# Patient Record
Sex: FEMALE | URBAN - METROPOLITAN AREA
[De-identification: names, ages, dates, MRNs, and addresses within clinical notes are randomized per-mention and may not be internally consistent; named-entity substitution may affect disease eponyms.]

---

## 2023-03-27 ENCOUNTER — APPOINTMENT (RX ONLY)
Dept: URBAN - METROPOLITAN AREA CLINIC 123 | Facility: CLINIC | Age: 58
Setting detail: DERMATOLOGY
End: 2023-03-27

## 2023-03-27 DIAGNOSIS — L29.8 OTHER PRURITUS: ICD-10-CM

## 2023-03-27 DIAGNOSIS — L23.9 ALLERGIC CONTACT DERMATITIS, UNSPECIFIED CAUSE: ICD-10-CM | Status: INADEQUATELY CONTROLLED

## 2023-03-27 DIAGNOSIS — L29.89 OTHER PRURITUS: ICD-10-CM

## 2023-03-27 PROCEDURE — ? PRESCRIPTION

## 2023-03-27 PROCEDURE — ? COUNSELING

## 2023-03-27 PROCEDURE — ? ADDITIONAL NOTES

## 2023-03-27 PROCEDURE — 99204 OFFICE O/P NEW MOD 45 MIN: CPT

## 2023-03-27 PROCEDURE — ? FULL BODY SKIN EXAM - DECLINED

## 2023-03-27 RX ORDER — MOMETASONE FUROATE 1 MG/G
CREAM TOPICAL BID
Qty: 45 | Refills: 5 | Status: ERX | COMMUNITY
Start: 2023-03-27

## 2023-03-27 RX ADMIN — MOMETASONE FUROATE: 1 CREAM TOPICAL at 00:00

## 2023-03-27 ASSESSMENT — LOCATION DETAILED DESCRIPTION DERM
LOCATION DETAILED: LEFT PROXIMAL PRETIBIAL REGION
LOCATION DETAILED: RIGHT DISTAL PRETIBIAL REGION
LOCATION DETAILED: LEFT DISTAL POSTERIOR UPPER ARM

## 2023-03-27 ASSESSMENT — LOCATION ZONE DERM
LOCATION ZONE: LEG
LOCATION ZONE: ARM

## 2023-03-27 ASSESSMENT — LOCATION SIMPLE DESCRIPTION DERM
LOCATION SIMPLE: LEFT PRETIBIAL REGION
LOCATION SIMPLE: LEFT POSTERIOR UPPER ARM
LOCATION SIMPLE: RIGHT PRETIBIAL REGION

## 2023-03-27 NOTE — PROCEDURE: ADDITIONAL NOTES
Detail Level: Simple
Additional Notes: Gave pt samples of CeraVe itch relief moisturizing lotion x2 and CeraVe moisturizing lotion x1.
Render Risk Assessment In Note?: no
Additional Notes: Recommend pt to take non-sedating antihistamines like Claritin and Allegra.

## 2023-07-08 ENCOUNTER — EMERGENCY (EMERGENCY)
Facility: HOSPITAL | Age: 58
LOS: 1 days | Discharge: HOME | End: 2023-07-08
Attending: STUDENT IN AN ORGANIZED HEALTH CARE EDUCATION/TRAINING PROGRAM
Payer: MEDICARE

## 2023-07-08 ENCOUNTER — TRANSCRIPTION ENCOUNTER (OUTPATIENT)
Age: 58
End: 2023-07-08

## 2023-07-08 VITALS
OXYGEN SATURATION: 99 % | RESPIRATION RATE: 18 BRPM | SYSTOLIC BLOOD PRESSURE: 124 MMHG | DIASTOLIC BLOOD PRESSURE: 73 MMHG | HEART RATE: 80 BPM | TEMPERATURE: 99 F

## 2023-07-08 VITALS
OXYGEN SATURATION: 98 % | HEART RATE: 82 BPM | HEIGHT: 64 IN | RESPIRATION RATE: 16 BRPM | DIASTOLIC BLOOD PRESSURE: 105 MMHG | TEMPERATURE: 98 F | SYSTOLIC BLOOD PRESSURE: 168 MMHG | WEIGHT: 145.06 LBS

## 2023-07-08 PROCEDURE — 93005 ELECTROCARDIOGRAM TRACING: CPT

## 2023-07-08 PROCEDURE — 71100 X-RAY EXAM RIBS UNI 2 VIEWS: CPT | Mod: 26

## 2023-07-08 PROCEDURE — 71100 X-RAY EXAM RIBS UNI 2 VIEWS: CPT

## 2023-07-08 PROCEDURE — 70450 CT HEAD/BRAIN W/O DYE: CPT | Mod: 26,MA

## 2023-07-08 PROCEDURE — 99285 EMERGENCY DEPT VISIT HI MDM: CPT

## 2023-07-08 PROCEDURE — 70450 CT HEAD/BRAIN W/O DYE: CPT | Mod: MA

## 2023-07-08 PROCEDURE — 99284 EMERGENCY DEPT VISIT MOD MDM: CPT | Mod: 25

## 2023-07-08 RX ORDER — MECLIZINE HCL 12.5 MG
25 TABLET ORAL ONCE
Refills: 0 | Status: COMPLETED | OUTPATIENT
Start: 2023-07-08 | End: 2023-07-08

## 2023-07-08 RX ORDER — ACETAMINOPHEN 500 MG
650 TABLET ORAL ONCE
Refills: 0 | Status: COMPLETED | OUTPATIENT
Start: 2023-07-08 | End: 2023-07-08

## 2023-07-08 RX ADMIN — Medication 650 MILLIGRAM(S): at 16:47

## 2023-07-08 RX ADMIN — Medication 25 MILLIGRAM(S): at 16:46

## 2023-07-08 NOTE — ED PROVIDER NOTE - ATTENDING CONTRIBUTION TO CARE
I performed a history and physical exam of the patient and discussed their management with the resident. I reviewed the resident's note and agree with the documented findings and plan of care, except as noted. My medical decision making and observations are found above.

## 2023-07-08 NOTE — ED PROVIDER NOTE - OBJECTIVE STATEMENT
Patient is a 57 y/o female [facility] hypertension who presents with fall 2/2 syncope. Patient states she bent down to grab something off of a table when she felt the room spinning and fell down. She does not remember the fall and isn't sure if she hit her head on anything. Her  heard the fall and by the time he arrived she was alert and oriented. She has a history (2016) of vertigo but denies any hx of falls. She endorses a throbbing left sided headache, dizziness, R. Arm pain, R Rib pain, and R leg pain -- denies nausea, vomiting, chest pain, shortness of breath, preceding palpitations or other prodromal symptoms. She tested positive for covid on 6/28, has had waxing and waning URI symptoms since. She is not on blood thinners.

## 2023-07-08 NOTE — ED PROVIDER NOTE - PATIENT PORTAL LINK FT
You can access the FollowMyHealth Patient Portal offered by [facility] by registering at the following website: [email or web address]. By joining [facility]’s FollowMyHealth portal, you will also be able to view your health information using other applications (apps) compatible with our system.

## 2023-07-08 NOTE — ED PROVIDER NOTE - NSFOLLOWUPINSTRUCTIONS_ED_ALL_ED_FT
You were seen in the Emergency Department for syncope + fall. Lab and imaging results, if performed, were discussed with you along with your discharge diagnosis.    To control your pain at home, you should take Tylenol 650mg-1000mg every 6 to 8 hours. Limit your maximum daily Tylenol from all sources to 4000mg. Be aware that many other medications contain acetaminophen which is also known as Tylenol. You shouldn’t take medications for more than a week without following up with your doctor.     Return to ED for any new or worsening symptoms including but not limited to: development of chest pain, shortness of breath, fever, vomiting, focal numbness, weakness or tingling, any severe CP, headache, abdominal pain, back pain.      Rest and keep yourself hydrated with fluids

## 2023-07-08 NOTE — ED PROVIDER NOTE - CARE PLAN
1 Principal Discharge DX:	Syncope  Secondary Diagnosis:	Acute headache due to traumatic injury of head

## 2023-07-08 NOTE — ED PROVIDER NOTE - PHYSICAL EXAMINATION
General: Alert and Orientated x 3. No apparent distress.  Head: Normocephalic and atraumatic.  Eyes: PERRLA with EOMI.  Neck: Supple. Trachea midline.   Cardiac: Normal S1 and S2 w/ RRR. No murmurs appreciated. No JVD appreciated.  Pulmonary: CTA bilaterally. No increased WOB. No wheezes or crackles.  Abdominal: Soft, non-tender. (+) bowel sounds appreciated in all 4 quadrants. No hepatosplenomegaly.   Neurologic: No focal sensory or motor deficits.  Musculoskeletal: Strength appropriate in all 4 extremities for age with no limited ROM. TTP R bicep, R lateral thigh, R ribs  Skin: Color appropriate for race. Intact, warm, and well-perfused.  Psychiatric: Appropriate mood and affect. No apparent risk to self or others.

## 2023-07-08 NOTE — ED PROVIDER NOTE - CLINICAL SUMMARY MEDICAL DECISION MAKING FREE TEXT BOX
57 y/o female presents with headache following unwitnessed fall this AM 2/2 syncopal episode. Given history of vertigo, acute onset + recovery, lack of cardiac risk factors, presumed etiology of syncope is vertigo. The patient does not completely remember the fall and has significant R sided pain indicative of a hard fall, so will CT head and X-ray R ribs. C-spine cleared by [facility]. Other etiologies of syncope include orthostasis, but this is unlikely given patient's hypertension. Also unlikely to be central vertigo due to acute onset/resolution, lack of nystagmus, and normal neuro exam.     Plan to manage pain with tylenol, dizziness with meclizine, CT head, x-ray R. Ribs. If no signs of intracranial bleed patient will be cleared to discharge with possible follow up with neurologist for vertigo. 57 y/o female presents with headache following unwitnessed fall this AM 2/2 syncopal episode. Given history of vertigo, acute onset + recovery, lack of cardiac risk factors, presumed etiology of syncope is vertigo. The patient does not completely remember the fall and has significant R sided pain indicative of a hard fall, so will CT head and X-ray R ribs. C-spine cleared by [facility]. Other etiologies of syncope include orthostasis, but this is unlikely given patient's hypertension. Also unlikely to be central vertigo due to acute onset/resolution, lack of nystagmus, and normal neuro exam.     Plan to manage pain with tylenol, dizziness with meclizine, CT head, x-ray R. Ribs. If no signs of intracranial bleed patient will be cleared to discharge with possible follow up with neurologist for vertigo.    VRao -- 58F with PMHx HTN, HLD, vertigo presenting with syncope and fall at 11am today, with preceding unspecified "dizziness" but no chest pain, palpitations, SOB. Presumed head strike and LOC. Endorsing continued headache without change in hearing/vision, paresthesias, focal weakness. Ambulatory; no ASA/AC. Exam with +R lateral chest wall TTP, no overlying skin change. A&Ox3, CN2-12 intact. 5/5 strength and intact sensation x4 extremities. Steady independent gait.    MDM-  Dizziness and fall with presumed headstrike, subsequent headache without neuro deficits, unlikely to represent acute traumatic intracranial pathology but will evaluate with CT head. XR to evaluate for rib fx. Meds for symptomatic treatment.    EKG with nonspecific TWI/flattening in V2-4, unchanged from prior EKG in 2019 on [facility]. No Brugada, delta, or epsilon waves, no acute ischemia. Low suspicion for cardiac involvement at this time.    Imaging negative, patient reporting symptomatic improvement. Plan for dc with neuro fu.

## 2023-07-08 NOTE — ED ADULT NURSE NOTE - NSFALLUNIVINTERV_ED_ALL_ED
Bed/Stretcher in lowest position, wheels locked, appropriate side rails in place/Call bell, personal items and telephone in reach/Instruct patient to call for assistance before getting out of bed/chair/stretcher/Non-slip footwear applied when patient is off stretcher/[place] to call system/Physically safe environment - no spills, clutter or unnecessary equipment/Purposeful proactive rounding/Room/bathroom lighting operational, light cord in reach

## 2023-07-08 NOTE — ED PROVIDER NOTE - NS ED ROS FT
CONSTITUTIONAL: No fevers, no chills, + lightheadedness, + dizziness  EYES: no visual changes, no eye pain  EARS: no ear drainage, no ear pain, no change in hearing  NOSE: no nasal congestion  MOUTH/THROAT: no sore throat  CV: No chest pain, no palpitations  RESP: No SOB, no cough  GI: No n/v/d, no abd pain  : no dysuria, no hematuria, no flank pain  MSK: no back pain, + R arm, R L pain, + rib pain  SKIN: no rashes  NEURO: + headache, no focal weakness, no decreased sensation/parasthesias   PSYCHIATRIC: no known mental health issues

## 2023-07-08 NOTE — ED PROVIDER NOTE - NS ED ATTENDING STATEMENT MOD
This was a shared visit with the LUIS. I reviewed and verified the documentation and independently performed the documented: Attending with

## 2023-07-10 PROBLEM — I10 ESSENTIAL (PRIMARY) HYPERTENSION: Chronic | Status: ACTIVE | Noted: 2023-07-08

## 2023-07-10 PROBLEM — R42 DIZZINESS AND GIDDINESS: Chronic | Status: ACTIVE | Noted: 2023-07-08

## 2023-08-02 ENCOUNTER — APPOINTMENT (OUTPATIENT)
Dept: PULMONOLOGY | Facility: CLINIC | Age: 58
End: 2023-08-02
Payer: MEDICARE

## 2023-08-02 ENCOUNTER — APPOINTMENT (OUTPATIENT)
Dept: CT IMAGING | Facility: IMAGING CENTER | Age: 58
End: 2023-08-02
Payer: MEDICARE

## 2023-08-02 ENCOUNTER — OUTPATIENT (OUTPATIENT)
Dept: OUTPATIENT SERVICES | Facility: HOSPITAL | Age: 58
LOS: 1 days | End: 2023-08-02
Payer: MEDICARE

## 2023-08-02 VITALS
HEART RATE: 80 BPM | SYSTOLIC BLOOD PRESSURE: 119 MMHG | HEIGHT: 64 IN | WEIGHT: 143 LBS | BODY MASS INDEX: 24.41 KG/M2 | OXYGEN SATURATION: 100 % | DIASTOLIC BLOOD PRESSURE: 66 MMHG

## 2023-08-02 DIAGNOSIS — U07.1 COVID-19: ICD-10-CM

## 2023-08-02 DIAGNOSIS — R53.83 OTHER FATIGUE: ICD-10-CM

## 2023-08-02 DIAGNOSIS — R94.2 ABNORMAL RESULTS OF PULMONARY FUNCTION STUDIES: ICD-10-CM

## 2023-08-02 PROCEDURE — 94618 PULMONARY STRESS TESTING: CPT

## 2023-08-02 PROCEDURE — 95012 NITRIC OXIDE EXP GAS DETER: CPT

## 2023-08-02 PROCEDURE — 99205 OFFICE O/P NEW HI 60 MIN: CPT | Mod: 25

## 2023-08-02 PROCEDURE — ZZZZZ: CPT

## 2023-08-02 PROCEDURE — 94727 GAS DIL/WSHOT DETER LNG VOL: CPT

## 2023-08-02 PROCEDURE — 94010 BREATHING CAPACITY TEST: CPT

## 2023-08-02 PROCEDURE — 71046 X-RAY EXAM CHEST 2 VIEWS: CPT

## 2023-08-02 PROCEDURE — 71250 CT THORAX DX C-: CPT

## 2023-08-02 PROCEDURE — 71250 CT THORAX DX C-: CPT | Mod: 26,MH

## 2023-08-02 PROCEDURE — 94729 DIFFUSING CAPACITY: CPT

## 2023-08-02 NOTE — PROCEDURE
[FreeTextEntry1] : NIOX nl  range `16  ppb 8/2/23  Chest x-ray PA lateral 8/2/23 Cardiac size normal Clear lung fields Parenchymal dominant pulmonary nodules Structures unremarkable Impression clear lungsSoft tissue  PFT no bronchodilator 42nd 2023 Mild reduction in flow rates Lung volumes normal Decreased ERV 25% predicted secondary to increased abdominal girth Positive air-trapping with RV/TLC ratio Percent predicted Moderate to severe reduction diffusion 54% predicted with gas exchange impairment  Able to compare data dating back to a PFT study of October 16, 2014 which demonstrates decline in overall pulmonary physiology  Pulmonary 6-minute walk exercise study August 2, 2023 Baseline O2 saturation 94% Room air Andrwe desaturation is unremarkable No desaturation Impression normal

## 2023-08-02 NOTE — PHYSICAL EXAM
[No Acute Distress] : no acute distress [Normal Oropharynx] : normal oropharynx [I] : Mallampati Class: I [Normal Appearance] : normal appearance [Supple] : supple [No Neck Mass] : no neck mass [No JVD] : no jvd [Normal Rate/Rhythm] : normal rate/rhythm [Normal S1, S2] : normal s1, s2 [No Murmurs] : no murmurs [No Rubs] : no rubs [No Gallops] : no gallops [No Resp Distress] : no resp distress [Normal Palpation] : normal palpation [Normal Rhythm and Effort] : normal rhythm and effort [Normal to Percussion] : normal to percussion [No Abnormalities] : no abnormalities [Benign] : benign [Not Tender] : not tender [Soft] : soft [No Clubbing] : no clubbing [No Cyanosis] : no cyanosis [No Edema] : no edema [Normal Color/ Pigmentation] : normal color/ pigmentation [No Focal Deficits] : no focal deficits [Oriented x3] : oriented x3 [Normal Affect] : normal affect

## 2023-08-02 NOTE — DISCUSSION/SUMMARY
[FreeTextEntry1] : Post COVID-19 infection Fatigue Exertional dyspnea Ongoing rheumatologic work-up with serology available for review which was negative Rule out subtle interstitial lung disease with abnormal PFT and gas exchange impairment CT chest Office follow-up

## 2023-08-02 NOTE — HISTORY OF PRESENT ILLNESS
[Never] : never [TextBox_4] : 58 year old  patient  Post COVID June 2023  persistent DRISCOLL  fatigue  no hx COPD asthma pneumonia Bronchitis  no wheeze  no  sputum  outside data SSAB neg Vit D 32.9 nl range uric acid nl range TSH  1.28 nl  CK nl range CCPO Ab neg RF neg CRP Neg ESR 22  Glucose 101 nl renal fx and lytes liver enz nl CBC nl  range

## 2023-08-02 NOTE — REVIEW OF SYSTEMS
[Fatigue] : fatigue [Back Pain] : back pain [Negative] : Psychiatric [Fever] : no fever [Chills] : no chills [Anxiety] : no anxiety [Diabetes] : no diabetes [Thyroid Problem] : no thyroid problem [TextBox_30] : HPI [TextBox_57] : ASA, NSAID

## 2023-08-10 ENCOUNTER — APPOINTMENT (OUTPATIENT)
Dept: PULMONOLOGY | Facility: CLINIC | Age: 58
End: 2023-08-10
Payer: MEDICARE

## 2023-08-10 VITALS — DIASTOLIC BLOOD PRESSURE: 83 MMHG | OXYGEN SATURATION: 99 % | SYSTOLIC BLOOD PRESSURE: 129 MMHG | HEART RATE: 59 BPM

## 2023-08-10 DIAGNOSIS — K21.9 GASTRO-ESOPHAGEAL REFLUX DISEASE W/OUT ESOPHAGITIS: ICD-10-CM

## 2023-08-10 DIAGNOSIS — R53.81 OTHER MALAISE: ICD-10-CM

## 2023-08-10 PROCEDURE — 99214 OFFICE O/P EST MOD 30 MIN: CPT

## 2023-08-10 RX ORDER — LANSOPRAZOLE 30 MG/1
30 CAPSULE, DELAYED RELEASE ORAL DAILY
Qty: 90 | Refills: 1 | Status: ACTIVE | COMMUNITY
Start: 2023-08-10 | End: 1900-01-01

## 2023-08-10 NOTE — HISTORY OF PRESENT ILLNESS
[Never] : never [TextBox_4] : 58 year old  patient  Post COVID June 2023  persistent DRISCOLL  fatigue  no hx COPD asthma pneumonia Bronchitis  no wheeze  no  sputum CT CHEST granuloma without parenchymal lung disease or ILD states cardiac ECHO per patient verbal neg GERD sxs but under GI  management outside data SSAB neg Vit D 32.9 nl range uric acid nl range TSH  1.28 nl  CK nl range CCPO Ab neg RF neg CRP Neg ESR 22  Glucose 101 nl renal fx and lytes liver enz nl CBC nl  range

## 2023-08-10 NOTE — DISCUSSION/SUMMARY
[FreeTextEntry1] : Post COVID-19 infection Pulmonary granuloma  No CT CHEST finding ILD Fatigue Exertional dyspnea Physical deconditioning Ongoing rheumatologic work-up with serology available for review which was negative Rule out subtle interstitial lung disease with abnormal PFT and gas exchange impairment CT chest as  noted Office follow-up  3  months PFT with BOX

## 2023-08-10 NOTE — REASON FOR VISIT
[Follow-Up] : a follow-up visit [Shortness of Breath] : shortness of breath [TextBox_44] : COVID Infection

## 2023-08-10 NOTE — PROCEDURE
[FreeTextEntry1] : CT chest 8/2/23 Left lower lobe calcified granuloma calcified left hilar lymph node compatible with Fatty liver No interstitial lung disease  NIOX nl  range `16  ppb 8/2/23  Chest x-ray PA lateral 8/2/23 Cardiac size normal Clear lung fields Parenchymal dominant pulmonary nodules Structures unremarkable Impression clear lungsSoft tissue  PFT no bronchodilator 8/2/23 Mild reduction in flow rates Lung volumes normal Decreased ERV 25% predicted secondary to increased abdominal girth Positive air-trapping with RV/TLC ratio Percent predicted Moderate to severe reduction diffusion 54% predicted with gas exchange impairment  Able to compare data dating back to a PFT study of October 16, 2014 which demonstrates decline in overall pulmonary physiology  Pulmonary 6-minute walk exercise study August 2, 2023 Baseline O2 saturation 94% Room air Andrew desaturation is unremarkable No desaturation Impression normal

## 2023-08-15 ENCOUNTER — APPOINTMENT (OUTPATIENT)
Dept: OTOLARYNGOLOGY | Facility: CLINIC | Age: 58
End: 2023-08-15

## 2023-09-29 ENCOUNTER — APPOINTMENT (OUTPATIENT)
Dept: PULMONOLOGY | Facility: CLINIC | Age: 58
End: 2023-09-29
Payer: MEDICARE

## 2023-09-29 PROCEDURE — 99213 OFFICE O/P EST LOW 20 MIN: CPT | Mod: 95

## 2023-09-29 RX ORDER — LOSARTAN POTASSIUM 25 MG/1
25 TABLET, FILM COATED ORAL
Refills: 0 | Status: ACTIVE | COMMUNITY
Start: 2023-09-29

## 2023-09-29 RX ORDER — ESOMEPRAZOLE MAGNESIUM 40 MG/1
40 CAPSULE, DELAYED RELEASE ORAL
Qty: 90 | Refills: 1 | Status: ACTIVE | COMMUNITY
Start: 2023-09-29 | End: 1900-01-01

## 2023-11-09 ENCOUNTER — APPOINTMENT (OUTPATIENT)
Dept: PULMONOLOGY | Facility: CLINIC | Age: 58
End: 2023-11-09
Payer: MEDICARE

## 2023-11-09 VITALS
RESPIRATION RATE: 16 BRPM | SYSTOLIC BLOOD PRESSURE: 114 MMHG | OXYGEN SATURATION: 95 % | DIASTOLIC BLOOD PRESSURE: 78 MMHG | HEART RATE: 71 BPM

## 2023-11-09 DIAGNOSIS — Z00.00 ENCOUNTER FOR GENERAL ADULT MEDICAL EXAMINATION W/OUT ABNORMAL FINDINGS: ICD-10-CM

## 2023-11-09 DIAGNOSIS — J84.10 PULMONARY FIBROSIS, UNSPECIFIED: ICD-10-CM

## 2023-11-09 DIAGNOSIS — Z23 ENCOUNTER FOR IMMUNIZATION: ICD-10-CM

## 2023-11-09 DIAGNOSIS — R06.02 SHORTNESS OF BREATH: ICD-10-CM

## 2023-11-09 DIAGNOSIS — E78.5 HYPERLIPIDEMIA, UNSPECIFIED: ICD-10-CM

## 2023-11-09 DIAGNOSIS — I10 ESSENTIAL (PRIMARY) HYPERTENSION: ICD-10-CM

## 2023-11-09 DIAGNOSIS — R94.2 ABNORMAL RESULTS OF PULMONARY FUNCTION STUDIES: ICD-10-CM

## 2023-11-09 LAB — POCT - HEMOGLOBIN (HGB), QUANTITATIVE, TRANSCUTANEOUS: 14.7

## 2023-11-09 PROCEDURE — 90686 IIV4 VACC NO PRSV 0.5 ML IM: CPT

## 2023-11-09 PROCEDURE — 94729 DIFFUSING CAPACITY: CPT

## 2023-11-09 PROCEDURE — 88738 HGB QUANT TRANSCUTANEOUS: CPT

## 2023-11-09 PROCEDURE — G0008: CPT

## 2023-11-09 PROCEDURE — 94727 GAS DIL/WSHOT DETER LNG VOL: CPT

## 2023-11-09 PROCEDURE — ZZZZZ: CPT

## 2023-11-09 PROCEDURE — 94010 BREATHING CAPACITY TEST: CPT

## 2023-11-09 PROCEDURE — 99214 OFFICE O/P EST MOD 30 MIN: CPT | Mod: 25

## 2023-11-09 PROCEDURE — 81003 URINALYSIS AUTO W/O SCOPE: CPT | Mod: QW

## 2023-11-10 DIAGNOSIS — E80.6 OTHER DISORDERS OF BILIRUBIN METABOLISM: ICD-10-CM

## 2023-11-10 LAB
ALBUMIN SERPL ELPH-MCNC: 5 G/DL
ALP BLD-CCNC: 86 U/L
ALT SERPL-CCNC: 35 U/L
ANION GAP SERPL CALC-SCNC: 15 MMOL/L
AST SERPL-CCNC: 28 U/L
BASOPHILS # BLD AUTO: 0.03 K/UL
BASOPHILS NFR BLD AUTO: 0.4 %
BILIRUB DIRECT SERPL-MCNC: 0.4 MG/DL
BILIRUB INDIRECT SERPL-MCNC: 1.2 MG/DL
BILIRUB SERPL-MCNC: 1.6 MG/DL
BUN SERPL-MCNC: 12 MG/DL
CALCIUM SERPL-MCNC: 10.3 MG/DL
CHLORIDE SERPL-SCNC: 101 MMOL/L
CHOLEST SERPL-MCNC: 175 MG/DL
CO2 SERPL-SCNC: 25 MMOL/L
CREAT SERPL-MCNC: 0.64 MG/DL
EGFR: 101 ML/MIN/1.73M2
EOSINOPHIL # BLD AUTO: 0.15 K/UL
EOSINOPHIL NFR BLD AUTO: 2 %
ESTIMATED AVERAGE GLUCOSE: 103 MG/DL
GLUCOSE SERPL-MCNC: 96 MG/DL
HBA1C MFR BLD HPLC: 5.2 %
HCT VFR BLD CALC: 43 %
HDLC SERPL-MCNC: 100 MG/DL
HGB BLD-MCNC: 14.1 G/DL
IMM GRANULOCYTES NFR BLD AUTO: 0.3 %
LDLC SERPL CALC-MCNC: 58 MG/DL
LYMPHOCYTES # BLD AUTO: 2.72 K/UL
LYMPHOCYTES NFR BLD AUTO: 35.5 %
MAN DIFF?: NORMAL
MCHC RBC-ENTMCNC: 32.3 PG
MCHC RBC-ENTMCNC: 32.8 GM/DL
MCV RBC AUTO: 98.4 FL
MONOCYTES # BLD AUTO: 0.59 K/UL
MONOCYTES NFR BLD AUTO: 7.7 %
NEUTROPHILS # BLD AUTO: 4.16 K/UL
NEUTROPHILS NFR BLD AUTO: 54.1 %
NONHDLC SERPL-MCNC: 76 MG/DL
PLATELET # BLD AUTO: 336 K/UL
POTASSIUM SERPL-SCNC: 4.6 MMOL/L
PROT SERPL-MCNC: 7.6 G/DL
RBC # BLD: 4.37 M/UL
RBC # FLD: 12.5 %
SODIUM SERPL-SCNC: 141 MMOL/L
T3 SERPL-MCNC: 119 NG/DL
T4 SERPL-MCNC: 6.3 UG/DL
TRIGL SERPL-MCNC: 102 MG/DL
TSH SERPL-ACNC: 2.13 UIU/ML
WBC # FLD AUTO: 7.67 K/UL

## 2023-11-17 ENCOUNTER — NON-APPOINTMENT (OUTPATIENT)
Age: 58
End: 2023-11-17

## 2023-11-17 ENCOUNTER — APPOINTMENT (OUTPATIENT)
Dept: ULTRASOUND IMAGING | Facility: CLINIC | Age: 58
End: 2023-11-17
Payer: MEDICARE

## 2023-11-17 DIAGNOSIS — K76.0 FATTY (CHANGE OF) LIVER, NOT ELSEWHERE CLASSIFIED: ICD-10-CM

## 2023-11-17 PROCEDURE — 76700 US EXAM ABDOM COMPLETE: CPT

## 2023-11-29 ENCOUNTER — OFFICE (OUTPATIENT)
Dept: URBAN - METROPOLITAN AREA CLINIC 90 | Facility: CLINIC | Age: 58
Setting detail: OPHTHALMOLOGY
End: 2023-11-29
Payer: MEDICARE

## 2023-11-29 DIAGNOSIS — H25.13: ICD-10-CM

## 2023-11-29 DIAGNOSIS — H01.004: ICD-10-CM

## 2023-11-29 DIAGNOSIS — H16.223: ICD-10-CM

## 2023-11-29 DIAGNOSIS — H01.005: ICD-10-CM

## 2023-11-29 PROCEDURE — 92014 COMPRE OPH EXAM EST PT 1/>: CPT | Performed by: OPHTHALMOLOGY

## 2023-11-29 ASSESSMENT — TEAR BREAK UP TIME (TBUT)
OS_TBUT: 2+
OD_TBUT: 2+

## 2023-11-29 ASSESSMENT — REFRACTION_AUTOREFRACTION
OD_CYLINDER: -0.50
OS_CYLINDER: -0.50
OS_SPHERE: +1.50
OS_AXIS: 053
OD_SPHERE: +1.25
OD_AXIS: 072

## 2023-11-29 ASSESSMENT — SUPERFICIAL PUNCTATE KERATITIS (SPK)
OS_SPK: ABSENT
OD_SPK: ABSENT

## 2023-11-29 ASSESSMENT — LID EXAM ASSESSMENTS
OD_COMMENTS: NO FOREIGN BODY, NO CONCRETIONS/CALCIFICATIONS.
OD_COMMENTS: LID EVERSION NORMAL
OS_COMMENTS: NO FOREIGN BODY, NO CONCRETIONS/CALCIFICATIONS.
OS_COMMENTS: LID EVERSION NORMAL
OS_BLEPHARITIS: LLL LUL T 1+

## 2023-11-29 ASSESSMENT — SPHEQUIV_DERIVED
OD_SPHEQUIV: 0.875
OD_SPHEQUIV: 1
OS_SPHEQUIV: 1.25
OS_SPHEQUIV: 1

## 2023-11-29 ASSESSMENT — REFRACTION_CURRENTRX
OS_OVR_VA: 20/
OS_SPHERE: +1.00
OD_ADD: +1.75
OS_ADD: +1.75
OD_SPHERE: +0.50
OD_CYLINDER: -
OD_OVR_VA: 20/

## 2023-11-29 ASSESSMENT — REFRACTION_MANIFEST
OS_AXIS: 135
OS_SPHERE: +0.75
OD_CYLINDER: SPHERE
OS_CYLINDER: +0.50
OD_SPHERE: +0.75
OS_CYLINDER: SPHERE
OD_VA1: 20/20
OS_VA1: 20/20
OS_SPHERE: +1.00
OD_SPHERE: +0.75
OD_CYLINDER: +0.25
OD_AXIS: 155

## 2023-11-29 ASSESSMENT — CONFRONTATIONAL VISUAL FIELD TEST (CVF)
OD_FINDINGS: FULL
OS_FINDINGS: FULL

## 2023-12-05 ENCOUNTER — NON-APPOINTMENT (OUTPATIENT)
Age: 58
End: 2023-12-05

## 2023-12-22 ENCOUNTER — RX RENEWAL (OUTPATIENT)
Age: 58
End: 2023-12-22

## 2023-12-27 ENCOUNTER — RX RENEWAL (OUTPATIENT)
Age: 58
End: 2023-12-27

## 2024-01-23 ENCOUNTER — RX RENEWAL (OUTPATIENT)
Age: 59
End: 2024-01-23

## 2024-01-23 RX ORDER — ATORVASTATIN CALCIUM 20 MG/1
20 TABLET, FILM COATED ORAL
Qty: 30 | Refills: 0 | Status: ACTIVE | COMMUNITY
Start: 2023-09-29 | End: 1900-01-01

## 2024-02-11 ENCOUNTER — RX RENEWAL (OUTPATIENT)
Age: 59
End: 2024-02-11

## 2024-02-11 RX ORDER — LOSARTAN POTASSIUM 25 MG/1
25 TABLET, FILM COATED ORAL
Qty: 90 | Refills: 0 | Status: ACTIVE | COMMUNITY
Start: 2023-09-29 | End: 1900-01-01

## 2024-05-02 ENCOUNTER — APPOINTMENT (OUTPATIENT)
Dept: PULMONOLOGY | Facility: CLINIC | Age: 59
End: 2024-05-02

## 2024-07-11 ENCOUNTER — APPOINTMENT (OUTPATIENT)
Dept: PULMONOLOGY | Facility: CLINIC | Age: 59
End: 2024-07-11

## 2024-08-22 ENCOUNTER — RX RENEWAL (OUTPATIENT)
Age: 59
End: 2024-08-22

## 2024-08-22 ENCOUNTER — APPOINTMENT (OUTPATIENT)
Dept: PULMONOLOGY | Facility: CLINIC | Age: 59
End: 2024-08-22

## 2024-09-27 ENCOUNTER — OFFICE (OUTPATIENT)
Age: 59
Setting detail: OPHTHALMOLOGY
End: 2024-09-27
Payer: MEDICARE

## 2024-09-27 DIAGNOSIS — H25.13: ICD-10-CM

## 2024-09-27 DIAGNOSIS — H01.004: ICD-10-CM

## 2024-09-27 DIAGNOSIS — H16.223: ICD-10-CM

## 2024-09-27 DIAGNOSIS — H01.001: ICD-10-CM

## 2024-09-27 PROCEDURE — 92012 INTRM OPH EXAM EST PATIENT: CPT | Performed by: OPHTHALMOLOGY

## 2024-09-27 ASSESSMENT — CONFRONTATIONAL VISUAL FIELD TEST (CVF)
OS_FINDINGS: FULL
OD_FINDINGS: FULL

## 2024-09-27 ASSESSMENT — LID EXAM ASSESSMENTS
OD_BLEPHARITIS: RUL 2+
OS_BLEPHARITIS: LUL 2+

## 2024-10-05 ENCOUNTER — NON-APPOINTMENT (OUTPATIENT)
Age: 59
End: 2024-10-05

## 2024-10-09 ENCOUNTER — APPOINTMENT (OUTPATIENT)
Dept: PULMONOLOGY | Facility: CLINIC | Age: 59
End: 2024-10-09
Payer: MEDICARE

## 2024-10-09 ENCOUNTER — LABORATORY RESULT (OUTPATIENT)
Age: 59
End: 2024-10-09

## 2024-10-09 ENCOUNTER — APPOINTMENT (OUTPATIENT)
Dept: PULMONOLOGY | Facility: CLINIC | Age: 59
End: 2024-10-09

## 2024-10-09 VITALS — HEART RATE: 72 BPM | DIASTOLIC BLOOD PRESSURE: 81 MMHG | OXYGEN SATURATION: 97 % | SYSTOLIC BLOOD PRESSURE: 128 MMHG

## 2024-10-09 DIAGNOSIS — K21.9 GASTRO-ESOPHAGEAL REFLUX DISEASE W/OUT ESOPHAGITIS: ICD-10-CM

## 2024-10-09 DIAGNOSIS — R94.2 ABNORMAL RESULTS OF PULMONARY FUNCTION STUDIES: ICD-10-CM

## 2024-10-09 DIAGNOSIS — R53.81 OTHER MALAISE: ICD-10-CM

## 2024-10-09 DIAGNOSIS — I10 ESSENTIAL (PRIMARY) HYPERTENSION: ICD-10-CM

## 2024-10-09 DIAGNOSIS — E78.5 HYPERLIPIDEMIA, UNSPECIFIED: ICD-10-CM

## 2024-10-09 DIAGNOSIS — D35.2 BENIGN NEOPLASM OF PITUITARY GLAND: ICD-10-CM

## 2024-10-09 DIAGNOSIS — Z00.00 ENCOUNTER FOR GENERAL ADULT MEDICAL EXAMINATION W/OUT ABNORMAL FINDINGS: ICD-10-CM

## 2024-10-09 LAB — POCT - HEMOGLOBIN (HGB), QUANTITATIVE, TRANSCUTANEOUS: 14.4

## 2024-10-09 PROCEDURE — ZZZZZ: CPT

## 2024-10-09 PROCEDURE — 94729 DIFFUSING CAPACITY: CPT

## 2024-10-09 PROCEDURE — 99214 OFFICE O/P EST MOD 30 MIN: CPT | Mod: 25

## 2024-10-09 PROCEDURE — 90656 IIV3 VACC NO PRSV 0.5 ML IM: CPT

## 2024-10-09 PROCEDURE — 93000 ELECTROCARDIOGRAM COMPLETE: CPT

## 2024-10-09 PROCEDURE — 81003 URINALYSIS AUTO W/O SCOPE: CPT | Mod: QW

## 2024-10-09 PROCEDURE — 88738 HGB QUANT TRANSCUTANEOUS: CPT

## 2024-10-09 PROCEDURE — G0008: CPT

## 2024-10-09 PROCEDURE — 71046 X-RAY EXAM CHEST 2 VIEWS: CPT

## 2024-10-09 PROCEDURE — 36415 COLL VENOUS BLD VENIPUNCTURE: CPT

## 2024-10-09 PROCEDURE — 94727 GAS DIL/WSHOT DETER LNG VOL: CPT

## 2024-10-09 PROCEDURE — 77085 DXA BONE DENSITY AXL VRT FX: CPT

## 2024-10-09 PROCEDURE — 94010 BREATHING CAPACITY TEST: CPT

## 2024-10-10 ENCOUNTER — NON-APPOINTMENT (OUTPATIENT)
Age: 59
End: 2024-10-10

## 2024-10-10 DIAGNOSIS — R79.89 OTHER SPECIFIED ABNORMAL FINDINGS OF BLOOD CHEMISTRY: ICD-10-CM

## 2024-10-10 LAB
25(OH)D3 SERPL-MCNC: 29.8 NG/ML
ALBUMIN SERPL ELPH-MCNC: 4.6 G/DL
ALP BLD-CCNC: 73 U/L
ALT SERPL-CCNC: 32 U/L
ANION GAP SERPL CALC-SCNC: 15 MMOL/L
AST SERPL-CCNC: 26 U/L
BASOPHILS # BLD AUTO: 0.03 K/UL
BASOPHILS NFR BLD AUTO: 0.5 %
BILIRUB DIRECT SERPL-MCNC: 0.2 MG/DL
BILIRUB INDIRECT SERPL-MCNC: 1 MG/DL
BILIRUB SERPL-MCNC: 1.2 MG/DL
BUN SERPL-MCNC: 8 MG/DL
CALCIUM SERPL-MCNC: 9.8 MG/DL
CHLORIDE SERPL-SCNC: 105 MMOL/L
CHOLEST SERPL-MCNC: 264 MG/DL
CO2 SERPL-SCNC: 23 MMOL/L
CREAT SERPL-MCNC: 0.66 MG/DL
EGFR: 100 ML/MIN/1.73M2
EOSINOPHIL # BLD AUTO: 0.18 K/UL
EOSINOPHIL NFR BLD AUTO: 3.1 %
ESTIMATED AVERAGE GLUCOSE: 103 MG/DL
FOLATE SERPL-MCNC: 16.9 NG/ML
GLUCOSE SERPL-MCNC: 89 MG/DL
HBA1C MFR BLD HPLC: 5.2 %
HCT VFR BLD CALC: 42.3 %
HCV AB SER QL: NONREACTIVE
HCV S/CO RATIO: 0.13 S/CO
HDLC SERPL-MCNC: 103 MG/DL
HGB BLD-MCNC: 14.1 G/DL
IMM GRANULOCYTES NFR BLD AUTO: 0.2 %
LDLC SERPL CALC-MCNC: 134 MG/DL
LYMPHOCYTES # BLD AUTO: 3.04 K/UL
LYMPHOCYTES NFR BLD AUTO: 52.1 %
MAN DIFF?: NORMAL
MCHC RBC-ENTMCNC: 32.8 PG
MCHC RBC-ENTMCNC: 33.3 GM/DL
MCV RBC AUTO: 98.4 FL
MONOCYTES # BLD AUTO: 0.5 K/UL
MONOCYTES NFR BLD AUTO: 8.6 %
NEUTROPHILS # BLD AUTO: 2.08 K/UL
NEUTROPHILS NFR BLD AUTO: 35.5 %
NONHDLC SERPL-MCNC: 161 MG/DL
PLATELET # BLD AUTO: 312 K/UL
POTASSIUM SERPL-SCNC: 4.8 MMOL/L
PROLACTIN SERPL-MCNC: 9.5 NG/ML
PROT SERPL-MCNC: 6.9 G/DL
RBC # BLD: 4.3 M/UL
RBC # FLD: 12.3 %
SODIUM SERPL-SCNC: 143 MMOL/L
T3 SERPL-MCNC: 107 NG/DL
T3RU NFR SERPL: 1.1 TBI
T4 FREE SERPL-MCNC: 1 NG/DL
T4 SERPL-MCNC: 6 UG/DL
TRIGL SERPL-MCNC: 158 MG/DL
TSH SERPL-ACNC: 2.25 UIU/ML
VIT B12 SERPL-MCNC: 514 PG/ML
WBC # FLD AUTO: 5.84 K/UL

## 2024-10-15 ENCOUNTER — NON-APPOINTMENT (OUTPATIENT)
Age: 59
End: 2024-10-15

## 2024-10-18 ENCOUNTER — TRANSCRIPTION ENCOUNTER (OUTPATIENT)
Age: 59
End: 2024-10-18

## 2024-10-21 ENCOUNTER — APPOINTMENT (OUTPATIENT)
Dept: MRI IMAGING | Facility: CLINIC | Age: 59
End: 2024-10-21
Payer: MEDICARE

## 2024-10-21 ENCOUNTER — OUTPATIENT (OUTPATIENT)
Dept: OUTPATIENT SERVICES | Facility: HOSPITAL | Age: 59
LOS: 1 days | End: 2024-10-21
Payer: MEDICARE

## 2024-10-21 DIAGNOSIS — D35.2 BENIGN NEOPLASM OF PITUITARY GLAND: ICD-10-CM

## 2024-10-21 PROCEDURE — 70553 MRI BRAIN STEM W/O & W/DYE: CPT | Mod: 26,MH

## 2024-10-23 ENCOUNTER — NON-APPOINTMENT (OUTPATIENT)
Age: 59
End: 2024-10-23

## 2024-10-24 ENCOUNTER — RX ONLY (RX ONLY)
Age: 59
End: 2024-10-24

## 2024-10-24 ENCOUNTER — OFFICE (OUTPATIENT)
Age: 59
Setting detail: OPHTHALMOLOGY
End: 2024-10-24
Payer: MEDICARE

## 2024-10-24 DIAGNOSIS — H25.13: ICD-10-CM

## 2024-10-24 DIAGNOSIS — H01.004: ICD-10-CM

## 2024-10-24 DIAGNOSIS — H01.001: ICD-10-CM

## 2024-10-24 DIAGNOSIS — H16.223: ICD-10-CM

## 2024-10-24 DIAGNOSIS — D35.2: ICD-10-CM

## 2024-10-24 PROCEDURE — 92014 COMPRE OPH EXAM EST PT 1/>: CPT | Performed by: OPHTHALMOLOGY

## 2024-10-24 PROCEDURE — 92083 EXTENDED VISUAL FIELD XM: CPT | Performed by: OPHTHALMOLOGY

## 2024-10-24 ASSESSMENT — REFRACTION_AUTOREFRACTION
OS_SPHERE: +1.50
OS_AXIS: 053
OD_CYLINDER: -0.25
OS_CYLINDER: -0.75
OD_AXIS: 153
OD_SPHERE: +1.00

## 2024-10-24 ASSESSMENT — REFRACTION_MANIFEST
OS_CYLINDER: SPHERE
OD_VA1: 20/20
OD_SPHERE: +0.75
OS_CYLINDER: +0.50
OD_CYLINDER: SPHERE
OD_AXIS: 155
OS_AXIS: 135
OS_SPHERE: +1.00
OD_CYLINDER: +0.25
OS_SPHERE: +0.75
OS_VA1: 20/20
OD_SPHERE: +0.75

## 2024-10-24 ASSESSMENT — TEAR BREAK UP TIME (TBUT)
OS_TBUT: 2+
OD_TBUT: 2+

## 2024-10-24 ASSESSMENT — LID EXAM ASSESSMENTS
OD_BLEPHARITIS: RUL 2+
OS_BLEPHARITIS: LUL 2+

## 2024-10-24 ASSESSMENT — REFRACTION_CURRENTRX
OD_ADD: +1.75
OS_OVR_VA: 20/
OD_OVR_VA: 20/
OD_SPHERE: +0.50
OD_CYLINDER: -
OS_ADD: +1.75
OS_SPHERE: +1.00

## 2024-10-24 ASSESSMENT — KERATOMETRY
OD_K1POWER_DIOPTERS: 41.50
OS_AXISANGLE_DEGREES: 113
OS_K1POWER_DIOPTERS: 41.75
METHOD_AUTO_MANUAL: AUTO
OS_K2POWER_DIOPTERS: 42.75
OD_AXISANGLE_DEGREES: 076
OD_K2POWER_DIOPTERS: 42.25

## 2024-10-24 ASSESSMENT — VISUAL ACUITY
OD_BCVA: 20/25
OS_BCVA: 20/30

## 2024-10-24 ASSESSMENT — TONOMETRY
OD_IOP_MMHG: 13
OS_IOP_MMHG: 15

## 2024-10-31 ENCOUNTER — APPOINTMENT (OUTPATIENT)
Dept: NEUROSURGERY | Facility: CLINIC | Age: 59
End: 2024-10-31

## 2024-11-07 ENCOUNTER — APPOINTMENT (OUTPATIENT)
Dept: PULMONOLOGY | Facility: CLINIC | Age: 59
End: 2024-11-07

## 2024-11-14 ENCOUNTER — OFFICE (OUTPATIENT)
Age: 59
Setting detail: OPHTHALMOLOGY
End: 2024-11-14
Payer: MEDICARE

## 2024-11-14 DIAGNOSIS — H01.001: ICD-10-CM

## 2024-11-14 DIAGNOSIS — H01.004: ICD-10-CM

## 2024-11-14 DIAGNOSIS — H16.223: ICD-10-CM

## 2024-11-14 PROCEDURE — 92012 INTRM OPH EXAM EST PATIENT: CPT | Performed by: STUDENT IN AN ORGANIZED HEALTH CARE EDUCATION/TRAINING PROGRAM

## 2024-11-14 ASSESSMENT — REFRACTION_MANIFEST
OS_CYLINDER: +0.50
OD_CYLINDER: SPHERE
OD_CYLINDER: +0.25
OD_VA1: 20/20
OD_AXIS: 155
OS_SPHERE: +0.75
OS_AXIS: 135
OS_SPHERE: +1.00
OS_CYLINDER: SPHERE
OS_VA1: 20/20
OD_SPHERE: +0.75
OD_SPHERE: +0.75

## 2024-11-14 ASSESSMENT — REFRACTION_AUTOREFRACTION
OS_SPHERE: +1.75
OD_SPHERE: +1.75
OS_AXIS: 066
OD_AXIS: 072
OD_CYLINDER: -0.50
OS_CYLINDER: -0.75

## 2024-11-14 ASSESSMENT — REFRACTION_CURRENTRX
OD_CYLINDER: -
OD_ADD: +1.75
OD_SPHERE: +0.50
OS_OVR_VA: 20/
OS_ADD: +1.75
OD_OVR_VA: 20/
OS_SPHERE: +1.00

## 2024-11-14 ASSESSMENT — LID EXAM ASSESSMENTS
OS_BLEPHARITIS: LUL 2+
OD_BLEPHARITIS: RUL 2+

## 2024-11-14 ASSESSMENT — KERATOMETRY
OD_AXISANGLE_DEGREES: 076
OD_K2POWER_DIOPTERS: 42.25
OS_K1POWER_DIOPTERS: 41.75
OD_K1POWER_DIOPTERS: 41.50
METHOD_AUTO_MANUAL: AUTO
OS_K2POWER_DIOPTERS: 42.75
OS_AXISANGLE_DEGREES: 113

## 2024-11-14 ASSESSMENT — CONFRONTATIONAL VISUAL FIELD TEST (CVF)
OD_FINDINGS: FULL
OS_FINDINGS: FULL

## 2024-11-14 ASSESSMENT — VISUAL ACUITY
OD_BCVA: 20/20-1
OS_BCVA: 20/30

## 2024-11-14 ASSESSMENT — TEAR BREAK UP TIME (TBUT)
OS_TBUT: 2+
OD_TBUT: 2+

## 2024-11-20 PROCEDURE — 70553 MRI BRAIN STEM W/O & W/DYE: CPT

## 2024-11-20 PROCEDURE — A9585: CPT

## 2024-12-02 ENCOUNTER — APPOINTMENT (OUTPATIENT)
Facility: CLINIC | Age: 59
End: 2024-12-02

## 2024-12-02 VITALS
SYSTOLIC BLOOD PRESSURE: 126 MMHG | HEART RATE: 75 BPM | OXYGEN SATURATION: 98 % | BODY MASS INDEX: 24.41 KG/M2 | WEIGHT: 143 LBS | HEIGHT: 64 IN | DIASTOLIC BLOOD PRESSURE: 80 MMHG

## 2024-12-02 DIAGNOSIS — D35.2 BENIGN NEOPLASM OF PITUITARY GLAND: ICD-10-CM

## 2024-12-02 PROCEDURE — G2211 COMPLEX E/M VISIT ADD ON: CPT

## 2024-12-02 PROCEDURE — 99204 OFFICE O/P NEW MOD 45 MIN: CPT

## 2024-12-02 RX ORDER — DEXAMETHASONE 1 MG/1
1 TABLET ORAL
Qty: 1 | Refills: 0 | Status: ACTIVE | COMMUNITY
Start: 2024-12-02 | End: 1900-01-01

## 2025-01-07 ASSESSMENT — REFRACTION_CURRENTRX
OS_ADD: +1.75
OD_OVR_VA: 20/
OD_ADD: +1.75
OS_OVR_VA: 20/
OS_SPHERE: +1.00
OD_SPHERE: +0.50
OD_CYLINDER: -

## 2025-01-07 ASSESSMENT — REFRACTION_MANIFEST
OS_CYLINDER: SPHERE
OD_SPHERE: +0.75
OS_ADD: +2.25
OD_CYLINDER: +0.25
OS_SPHERE: +1.00
OD_CYLINDER: SPHERE
OS_CYLINDER: SPHERE
OD_SPHERE: +0.75
OS_VA1: 20/20
OS_AXIS: 135
OD_ADD: +2.25
OD_SPHERE: +0.75
OS_SPHERE: +1.00
OD_AXIS: 155
OD_CYLINDER: SPHERE
OS_CYLINDER: +0.50
OD_VA1: 20/20
OS_SPHERE: +0.75

## 2025-01-07 ASSESSMENT — KERATOMETRY
OD_AXISANGLE_DEGREES: 076
OD_K2POWER_DIOPTERS: 42.25
OS_K1POWER_DIOPTERS: 41.75
METHOD_AUTO_MANUAL: AUTO
OD_K1POWER_DIOPTERS: 41.50
OS_AXISANGLE_DEGREES: 113
OS_K2POWER_DIOPTERS: 42.75

## 2025-02-14 ENCOUNTER — RX RENEWAL (OUTPATIENT)
Age: 60
End: 2025-02-14

## 2025-03-17 ENCOUNTER — APPOINTMENT (OUTPATIENT)
Dept: OTOLARYNGOLOGY | Facility: CLINIC | Age: 60
End: 2025-03-17

## 2025-08-07 ENCOUNTER — RX RENEWAL (OUTPATIENT)
Age: 60
End: 2025-08-07